# Patient Record
Sex: MALE | Race: WHITE | NOT HISPANIC OR LATINO | Employment: FULL TIME | URBAN - METROPOLITAN AREA
[De-identification: names, ages, dates, MRNs, and addresses within clinical notes are randomized per-mention and may not be internally consistent; named-entity substitution may affect disease eponyms.]

---

## 2023-08-30 DIAGNOSIS — M25.552 BILATERAL HIP PAIN: Primary | ICD-10-CM

## 2023-08-30 DIAGNOSIS — M25.551 BILATERAL HIP PAIN: Primary | ICD-10-CM

## 2023-09-07 ENCOUNTER — OFFICE VISIT (OUTPATIENT)
Dept: SPORTS MEDICINE | Facility: CLINIC | Age: 68
End: 2023-09-07
Payer: COMMERCIAL

## 2023-09-07 ENCOUNTER — HOSPITAL ENCOUNTER (OUTPATIENT)
Dept: RADIOLOGY | Facility: CLINIC | Age: 68
Discharge: HOME | End: 2023-09-07
Attending: FAMILY MEDICINE
Payer: COMMERCIAL

## 2023-09-07 DIAGNOSIS — M25.552 BILATERAL HIP PAIN: ICD-10-CM

## 2023-09-07 DIAGNOSIS — M54.31 SCIATICA OF RIGHT SIDE: ICD-10-CM

## 2023-09-07 DIAGNOSIS — M16.11 ARTHRITIS OF RIGHT HIP: ICD-10-CM

## 2023-09-07 DIAGNOSIS — M25.551 BILATERAL HIP PAIN: ICD-10-CM

## 2023-09-07 DIAGNOSIS — M25.551 RIGHT HIP PAIN: ICD-10-CM

## 2023-09-07 DIAGNOSIS — G57.01 PIRIFORMIS SYNDROME OF RIGHT SIDE: Primary | ICD-10-CM

## 2023-09-07 PROCEDURE — 4010F PR ACE/ARB THEARPY RXD/TAKEN: ICD-10-PCS | Mod: CPTII,,, | Performed by: FAMILY MEDICINE

## 2023-09-07 PROCEDURE — 3288F PR FALLS RISK ASSESSMENT DOCUMENTED: ICD-10-PCS | Mod: CPTII,,, | Performed by: FAMILY MEDICINE

## 2023-09-07 PROCEDURE — 1101F PR PT FALLS ASSESS DOC 0-1 FALLS W/OUT INJ PAST YR: ICD-10-PCS | Mod: CPTII,,, | Performed by: FAMILY MEDICINE

## 2023-09-07 PROCEDURE — 1101F PT FALLS ASSESS-DOCD LE1/YR: CPT | Mod: CPTII,,, | Performed by: FAMILY MEDICINE

## 2023-09-07 PROCEDURE — 1159F MED LIST DOCD IN RCRD: CPT | Mod: CPTII,,, | Performed by: FAMILY MEDICINE

## 2023-09-07 PROCEDURE — 99203 PR OFFICE/OUTPT VISIT, NEW, LEVL III, 30-44 MIN: ICD-10-PCS | Mod: ,,, | Performed by: FAMILY MEDICINE

## 2023-09-07 PROCEDURE — 73521 XR HIPS BILATERAL 2 VIEW INCL AP PELVIS: ICD-10-PCS | Mod: 26,,, | Performed by: RADIOLOGY

## 2023-09-07 PROCEDURE — 73521 X-RAY EXAM HIPS BI 2 VIEWS: CPT | Mod: 26,,, | Performed by: RADIOLOGY

## 2023-09-07 PROCEDURE — 1125F AMNT PAIN NOTED PAIN PRSNT: CPT | Mod: CPTII,,, | Performed by: FAMILY MEDICINE

## 2023-09-07 PROCEDURE — 1125F PR PAIN SEVERITY QUANTIFIED, PAIN PRESENT: ICD-10-PCS | Mod: CPTII,,, | Performed by: FAMILY MEDICINE

## 2023-09-07 PROCEDURE — 4010F ACE/ARB THERAPY RXD/TAKEN: CPT | Mod: CPTII,,, | Performed by: FAMILY MEDICINE

## 2023-09-07 PROCEDURE — 3288F FALL RISK ASSESSMENT DOCD: CPT | Mod: CPTII,,, | Performed by: FAMILY MEDICINE

## 2023-09-07 PROCEDURE — 73521 XR HIPS BILATERAL 2 VIEW INCL AP PELVIS: ICD-10-PCS | Mod: TC,,, | Performed by: FAMILY MEDICINE

## 2023-09-07 PROCEDURE — 73521 X-RAY EXAM HIPS BI 2 VIEWS: CPT | Mod: TC,,, | Performed by: FAMILY MEDICINE

## 2023-09-07 PROCEDURE — 99203 OFFICE O/P NEW LOW 30 MIN: CPT | Mod: ,,, | Performed by: FAMILY MEDICINE

## 2023-09-07 PROCEDURE — 1159F PR MEDICATION LIST DOCUMENTED IN MEDICAL RECORD: ICD-10-PCS | Mod: CPTII,,, | Performed by: FAMILY MEDICINE

## 2023-09-07 RX ORDER — FUROSEMIDE 40 MG/1
40 TABLET ORAL 2 TIMES DAILY
COMMUNITY

## 2023-09-07 RX ORDER — METHYLPREDNISOLONE 4 MG/1
TABLET ORAL
Qty: 21 EACH | Refills: 0 | Status: SHIPPED | OUTPATIENT
Start: 2023-09-07

## 2023-09-07 RX ORDER — ROSUVASTATIN CALCIUM 40 MG/1
1 TABLET, COATED ORAL DAILY
COMMUNITY
Start: 2022-10-31

## 2023-09-07 RX ORDER — TIZANIDINE 4 MG/1
4 TABLET ORAL EVERY 6 HOURS PRN
Qty: 60 TABLET | Refills: 0 | Status: SHIPPED | OUTPATIENT
Start: 2023-09-07 | End: 2023-09-22

## 2023-09-07 NOTE — PROGRESS NOTES
Subjective:     Patient ID: Jean Espinoza is a 68 y.o. male.    Chief Complaint: Injury of the Right Hip    Patient here today with complaints of right hip pain.  This has been present for the last six months.  Notes that it began after he fell out of bed landing directly on his right hip.  Since that time he has been having occasional pain in the posterior aspect cassette with pain that will shoot down his leg to his thigh.  He notes that the pain radiating down his leg does go away he walks on it some.  He feels a sensation of stiffness in his posterior hip.  States that it feels like a muscle.  States he is tried ibuprofen for the pain which does help for a few hours.  However he is not supposed to take NSAIDs due to cardiac disease and being on anticoagulants.  He does have a significant history of an L4-5 diskectomy in 1989.  Does have chronic back pain since that time however he states this is different than his typical back pain.  He states that he still is able to perform stretches of the hip which do help did not make it go away.        Review of Systems   Constitutional: Negative for chills and decreased appetite.   HENT:  Negative for congestion and sore throat.    Eyes:  Negative for blurred vision.   Cardiovascular:  Negative for chest pain, dyspnea on exertion and palpitations.   Respiratory:  Negative for cough and shortness of breath.    Skin:  Negative for rash.   Neurological:  Negative for difficulty with concentration, disturbances in coordination and headaches.   Psychiatric/Behavioral:  Negative for altered mental status, depression, hallucinations, memory loss and suicidal ideas.        Objective:       General    Nursing note and vitals reviewed.  Constitutional: He is oriented to person, place, and time. He appears well-developed and well-nourished.   HENT:   Nose: Nose normal.   Eyes: EOM are normal. Pupils are equal, round, and reactive to light.   Neck: Neck supple.   Cardiovascular:   Normal rate.            Pulmonary/Chest: Effort normal.   Abdominal: Soft.   Neurological: He is alert and oriented to person, place, and time. He has normal reflexes.   Psychiatric: He has a normal mood and affect. His behavior is normal. Judgment and thought content normal.             Right Hip Exam     Inspection   Scars: absent  Swelling: absent  No deformity of hip.    Tenderness   The patient tender to palpation of the piriformis.    Range of Motion   Extension:  20   Flexion:  120   External rotation:  60   Internal rotation:  30     Muscle Strength   The patient has normal right hip strength.    Tests   Pain w/ forced internal rotation (SHARRON): present  Pain w/ forced external rotation (FADIR): present  Carl: negative  Circumduction test: negative  Stinchfield test: positive  Log Roll: negative  Snapping Hip (internal): negative    Other   Sensation: normal  Left Hip Exam   Left hip exam is normal.    Range of Motion   The patient has normal left hip ROM.    Muscle Strength   The patient has normal left hip strength.           Vascular Exam     Right Pulses  Dorsalis Pedis:      2+            Physical Exam  Vitals and nursing note reviewed.   Constitutional:       Appearance: He is well-developed and well-nourished.   HENT:      Nose: Nose normal.   Eyes:      Extraocular Movements: EOM normal.      Pupils: Pupils are equal, round, and reactive to light.   Cardiovascular:      Rate and Rhythm: Normal rate.      Pulses:           Dorsalis pedis pulses are 2+ on the right side.   Pulmonary:      Effort: Pulmonary effort is normal.   Abdominal:      Palpations: Abdomen is soft.   Musculoskeletal:      Cervical back: Normal range of motion and neck supple.      Right hip: No swelling or deformity.   Neurological:      Mental Status: He is alert and oriented to person, place, and time.      Deep Tendon Reflexes: Reflexes are normal and symmetric.   Psychiatric:         Mood and Affect: Mood and affect normal.          Behavior: Behavior normal.         Thought Content: Thought content normal.         Judgment: Judgment normal.       X-ray images ordered obtained interpreted by me.  They show some very mild degenerative changes of both hips worse on the right side.  There was significant degenerative changes of the lumbar spine especially at L4-5.  No acute fractures are present.      Assessment:     Encounter Diagnoses   Name Primary?    Right hip pain     Piriformis syndrome of right side Yes    Arthritis of right hip     Sciatica of right side        Plan:      Jean was seen today for injury.    Diagnoses and all orders for this visit:    Piriformis syndrome of right side    Right hip pain    Arthritis of right hip    Sciatica of right side    Other orders  -     methylPREDNISolone (MEDROL DOSEPACK) 4 mg tablet; use as directed  -     tiZANidine (ZANAFLEX) 4 MG tablet; Take 1 tablet (4 mg total) by mouth every 6 (six) hours as needed (spasm).      His exam today is consistent piriformis syndrome.  Will place him on a Medrol Dosepak and muscle relaxers.  If that does not significantly improve his symptoms within a week I will have him follow up on the [place] office for ultrasound-guided piriformis injection.  If we do not have success there I would recommend he be seen at the [facility].